# Patient Record
Sex: FEMALE | Race: WHITE | NOT HISPANIC OR LATINO | Employment: FULL TIME | URBAN - NONMETROPOLITAN AREA
[De-identification: names, ages, dates, MRNs, and addresses within clinical notes are randomized per-mention and may not be internally consistent; named-entity substitution may affect disease eponyms.]

---

## 2024-11-12 PROBLEM — E06.1 DE QUERVAIN THYROIDITIS: Status: ACTIVE | Noted: 2024-11-12

## 2024-11-12 PROBLEM — M65.4 DE QUERVAIN'S DISEASE (RADIAL STYLOID TENOSYNOVITIS): Status: ACTIVE | Noted: 2024-11-12

## 2024-11-12 NOTE — PROGRESS NOTES
"Subjective   Patient ID: Nalini Madison is a 54 y.o. female who presents for Establish Care.    HPI   Pt comes in to establish pcp.      Specifically to discuss elevated cholesterol found on her biometric screening: chol 246, HDL 40, , Trig 141, hga1c 5.8 .  Pt doesn't really exercises; not on a good diet.  10 year cad risk is only 3.5%.  Rest of labs: cbc, cmp are fine      Needs mammogram: ordered  Colorectal: will see [facility] next week  PAP: due 11/2025      Past Medical History:   Diagnosis Date    De Quervain thyroiditis 11/12/2024    De Quervain's disease (radial styloid tenosynovitis) 11/12/2024     Current Outpatient Medications   Medication Sig Dispense Refill    biotin 10 mg tablet once every 24 hours.      naproxen sodium (Aleve) 220 mg tablet every 12 hours.       No current facility-administered medications for this visit.       Review of Systems see hpi    Objective   /72   Pulse 89   Ht 1.651 m (5' 5\")   Wt 95.9 kg (211 lb 6.4 oz)   SpO2 97%   BMI 35.18 kg/m²     Physical Exam  Vitals reviewed.   Constitutional:       Appearance: Normal appearance.   HENT:      Head: Normocephalic and atraumatic.      Right Ear: Tympanic membrane, ear canal and external ear normal.      Left Ear: Tympanic membrane, ear canal and external ear normal.      Mouth/Throat:      Mouth: Mucous membranes are moist.   Eyes:      Extraocular Movements: Extraocular movements intact.      Pupils: Pupils are equal, round, and reactive to light.   Cardiovascular:      Rate and Rhythm: Normal rate and regular rhythm.      Heart sounds: Normal heart sounds.   Pulmonary:      Effort: Pulmonary effort is normal.      Breath sounds: Normal breath sounds.   Abdominal:      Tenderness: There is no right CVA tenderness or left CVA tenderness.   Musculoskeletal:      Right lower leg: No edema.      Left lower leg: No edema.   Skin:     General: Skin is warm.   Neurological:      General: No focal deficit " present.      Mental Status: She is alert and oriented to person, place, and time.      Cranial Nerves: Cranial nerves 2-12 are intact.      Motor: Motor function is intact.      Coordination: Coordination is intact.      Deep Tendon Reflexes: Reflexes are normal and symmetric.   Psychiatric:         Mood and Affect: Mood normal.         Judgment: Judgment normal.         Assessment/Plan   Assessment & Plan  Well adult exam  Rec shingrix vaccine in future.  Will be seeing GI next week for c scope.  Will do pap next year in 2025 at next biometric physical       Mixed hyperlipidemia  No statin needed at this time due to low 10 yr risk.  Advised 30 min cardio exercise 5x/wk; she has an elliptical she can start on.  Also 5 servings fruits/vegies any combo daily for fiber, vitamin/mineral amounts.  Avoid sat fats and use more olive oil and unsat fats; avoid processed foods.  Omega 3 rich foods will help boost HDL too.         Prediabetes  Decrease carbs/starches in diet; get exercise most days of week.       Encounter for screening mammogram for malignant neoplasm of breast    Orders:    BI mammo bilateral screening tomosynthesis; Future    Encounter for immunization    Orders:    Tdap vaccine, age 7 years and older  (BOOSTRIX)

## 2024-11-21 RX ORDER — BIOTIN 10 MG
TABLET ORAL EVERY 24 HOURS
COMMUNITY

## 2024-11-21 RX ORDER — NAPROXEN SODIUM 220 MG
TABLET ORAL EVERY 12 HOURS
COMMUNITY

## 2024-11-29 ENCOUNTER — OFFICE VISIT (OUTPATIENT)
Dept: PRIMARY CARE | Facility: CLINIC | Age: 54
End: 2024-11-29
Payer: COMMERCIAL

## 2024-11-29 VITALS
OXYGEN SATURATION: 97 % | DIASTOLIC BLOOD PRESSURE: 72 MMHG | SYSTOLIC BLOOD PRESSURE: 124 MMHG | WEIGHT: 211.4 LBS | HEART RATE: 89 BPM | HEIGHT: 65 IN | BODY MASS INDEX: 35.22 KG/M2

## 2024-11-29 DIAGNOSIS — Z12.31 ENCOUNTER FOR SCREENING MAMMOGRAM FOR MALIGNANT NEOPLASM OF BREAST: ICD-10-CM

## 2024-11-29 DIAGNOSIS — E78.2 MIXED HYPERLIPIDEMIA: ICD-10-CM

## 2024-11-29 DIAGNOSIS — Z00.00 WELL ADULT EXAM: Primary | ICD-10-CM

## 2024-11-29 DIAGNOSIS — R73.03 PREDIABETES: ICD-10-CM

## 2024-11-29 DIAGNOSIS — Z23 ENCOUNTER FOR IMMUNIZATION: ICD-10-CM

## 2024-11-29 PROBLEM — E78.5 HYPERLIPIDEMIA: Status: ACTIVE | Noted: 2024-11-29

## 2024-11-29 PROCEDURE — 99386 PREV VISIT NEW AGE 40-64: CPT | Performed by: FAMILY MEDICINE

## 2024-11-29 PROCEDURE — 1036F TOBACCO NON-USER: CPT | Performed by: FAMILY MEDICINE

## 2024-11-29 PROCEDURE — 90471 IMMUNIZATION ADMIN: CPT | Performed by: FAMILY MEDICINE

## 2024-11-29 PROCEDURE — 3008F BODY MASS INDEX DOCD: CPT | Performed by: FAMILY MEDICINE

## 2024-11-29 PROCEDURE — 90715 TDAP VACCINE 7 YRS/> IM: CPT | Performed by: FAMILY MEDICINE

## 2024-11-29 ASSESSMENT — PATIENT HEALTH QUESTIONNAIRE - PHQ9
2. FEELING DOWN, DEPRESSED OR HOPELESS: NOT AT ALL
SUM OF ALL RESPONSES TO PHQ9 QUESTIONS 1 AND 2: 0
1. LITTLE INTEREST OR PLEASURE IN DOING THINGS: NOT AT ALL

## 2024-11-29 ASSESSMENT — PAIN SCALES - GENERAL: PAINLEVEL_OUTOF10: 0-NO PAIN

## 2024-11-29 NOTE — ASSESSMENT & PLAN NOTE
No statin needed at this time due to low 10 yr risk.  Advised 30 min cardio exercise 5x/wk; she has an elliptical she can start on.  Also 5 servings fruits/vegies any combo daily for fiber, vitamin/mineral amounts.  Avoid sat fats and use more olive oil and unsat fats; avoid processed foods.  Omega 3 rich foods will help boost HDL too.

## 2024-12-03 ENCOUNTER — HOSPITAL ENCOUNTER (OUTPATIENT)
Dept: RADIOLOGY | Facility: CLINIC | Age: 54
Discharge: HOME | End: 2024-12-03
Payer: COMMERCIAL

## 2024-12-03 VITALS — HEIGHT: 65 IN | BODY MASS INDEX: 35.22 KG/M2 | WEIGHT: 211.42 LBS

## 2024-12-03 DIAGNOSIS — Z12.31 ENCOUNTER FOR SCREENING MAMMOGRAM FOR MALIGNANT NEOPLASM OF BREAST: ICD-10-CM

## 2024-12-03 PROCEDURE — 77063 BREAST TOMOSYNTHESIS BI: CPT | Performed by: RADIOLOGY

## 2024-12-03 PROCEDURE — 77067 SCR MAMMO BI INCL CAD: CPT

## 2024-12-03 PROCEDURE — 77067 SCR MAMMO BI INCL CAD: CPT | Performed by: RADIOLOGY
